# Patient Record
Sex: FEMALE | Race: WHITE | Employment: FULL TIME | URBAN - METROPOLITAN AREA
[De-identification: names, ages, dates, MRNs, and addresses within clinical notes are randomized per-mention and may not be internally consistent; named-entity substitution may affect disease eponyms.]

---

## 2017-10-12 ENCOUNTER — TRANSFERRED RECORDS (OUTPATIENT)
Dept: HEALTH INFORMATION MANAGEMENT | Facility: CLINIC | Age: 42
End: 2017-10-12

## 2017-10-20 DIAGNOSIS — Z79.899 POLYPHARMACY: ICD-10-CM

## 2017-10-20 DIAGNOSIS — O09.529 AMA (ADVANCED MATERNAL AGE) MULTIGRAVIDA 35+: Primary | ICD-10-CM

## 2017-10-20 DIAGNOSIS — Z31.69 ENCOUNTER FOR PRECONCEPTION CONSULTATION: ICD-10-CM

## 2017-10-27 ENCOUNTER — PRE VISIT (OUTPATIENT)
Dept: MATERNAL FETAL MEDICINE | Facility: CLINIC | Age: 42
End: 2017-10-27

## 2017-10-31 ENCOUNTER — OFFICE VISIT (OUTPATIENT)
Dept: MATERNAL FETAL MEDICINE | Facility: CLINIC | Age: 42
End: 2017-10-31
Attending: OBSTETRICS & GYNECOLOGY
Payer: COMMERCIAL

## 2017-10-31 ENCOUNTER — OFFICE VISIT (OUTPATIENT)
Dept: PHARMACY | Facility: CLINIC | Age: 42
End: 2017-10-31
Payer: COMMERCIAL

## 2017-10-31 VITALS — DIASTOLIC BLOOD PRESSURE: 85 MMHG | HEART RATE: 90 BPM | SYSTOLIC BLOOD PRESSURE: 128 MMHG

## 2017-10-31 DIAGNOSIS — Z31.69 INFERTILITY COUNSELING: ICD-10-CM

## 2017-10-31 DIAGNOSIS — E28.2 PCOS (POLYCYSTIC OVARIAN SYNDROME): ICD-10-CM

## 2017-10-31 DIAGNOSIS — Z82.49 FAMILY HISTORY OF BLOOD CLOTS: ICD-10-CM

## 2017-10-31 DIAGNOSIS — O09.529 AMA (ADVANCED MATERNAL AGE) MULTIGRAVIDA 35+: ICD-10-CM

## 2017-10-31 DIAGNOSIS — Z79.899 POLYPHARMACY: ICD-10-CM

## 2017-10-31 DIAGNOSIS — I10 CHRONIC HYPERTENSION: Primary | ICD-10-CM

## 2017-10-31 DIAGNOSIS — Z31.69 ENCOUNTER FOR PRECONCEPTION CONSULTATION: ICD-10-CM

## 2017-10-31 DIAGNOSIS — I10 BENIGN ESSENTIAL HYPERTENSION: Primary | ICD-10-CM

## 2017-10-31 DIAGNOSIS — F43.22 ADJUSTMENT DISORDER WITH ANXIOUS MOOD: ICD-10-CM

## 2017-10-31 DIAGNOSIS — K21.9 GASTROESOPHAGEAL REFLUX DISEASE WITHOUT ESOPHAGITIS: ICD-10-CM

## 2017-10-31 DIAGNOSIS — F32.A DEPRESSION, UNSPECIFIED DEPRESSION TYPE: ICD-10-CM

## 2017-10-31 DIAGNOSIS — Z31.69 ENCOUNTER FOR PRECONCEPTION CONSULTATION: Primary | ICD-10-CM

## 2017-10-31 DIAGNOSIS — J30.2 SEASONAL ALLERGIC RHINITIS, UNSPECIFIED CHRONICITY, UNSPECIFIED TRIGGER: ICD-10-CM

## 2017-10-31 PROCEDURE — 96040 ZZH GENETIC COUNSELING, EACH 30 MINUTES: CPT | Mod: ZF | Performed by: GENETIC COUNSELOR, MS

## 2017-10-31 PROCEDURE — 99607 MTMS BY PHARM ADDL 15 MIN: CPT | Performed by: PHARMACIST

## 2017-10-31 PROCEDURE — 99605 MTMS BY PHARM NP 15 MIN: CPT | Performed by: PHARMACIST

## 2017-10-31 RX ORDER — ALBUTEROL SULFATE 90 UG/1
1-2 AEROSOL, METERED RESPIRATORY (INHALATION) EVERY 6 HOURS PRN
COMMUNITY

## 2017-10-31 RX ORDER — CLONAZEPAM 1 MG/1
TABLET ORAL
COMMUNITY

## 2017-10-31 RX ORDER — VENLAFAXINE HYDROCHLORIDE 150 MG/1
150 CAPSULE, EXTENDED RELEASE ORAL DAILY
COMMUNITY

## 2017-10-31 RX ORDER — FLUTICASONE PROPIONATE 50 MCG
2 SPRAY, SUSPENSION (ML) NASAL 2 TIMES DAILY
COMMUNITY

## 2017-10-31 NOTE — PATIENT INSTRUCTIONS
Recommendations from today's MTM visit:                                                    MTM (medication therapy management) is a service provided by a clinical pharmacist designed to help you get the most of out of your medicines.     1.  Lisinopril is associated with an increased risk of birth defects when used early in pregnancy.  I would recommend that you stop this prior to conception.  If your blood pressure goes up, safe medications in pregnancy include labetolol or methyldopa.  Alternatively, the HCTZ dose could be increased back to 25 mg in BP increases.    2. For your allergies, it is safe to continue the Flonase, Zyrtec and albuterol inhaler when needed for shortness of breath.    3. For your PCOS, it is safe to continue the metformin, but you can discuss the ongoing need for this with your OB team moving forward.    4. For the omeprazole, it is likely safe to continue to use this in pregnancy, if you need it to control your heartburn symptoms.    5.  For the anxiety, it is best to be off of benzodiazepines (which is your clonazepam) during pregnancy.   You are currently taking clonazepam 0.5 mg every morning and 1 mg at night.  I'd recommend you follow-up with your NP, Alysa Benz, to discuss a plan for this.  One option would be to taper as follows:  Decrease to 0.5 mg twice a day (1/2 tablet twice a day) for 1-2 weeks, then decrease to 0.5 mg at bedtime for 1-2 weeks, then off.  With this change, you can stay stable on the venlafaxine for now;  You can talk with Alysa about changing back to sertraline in the future if you both feel this would be an effective option for you.  You can also talk with Alysa about the option to have a prescription for lorazepam for treatment of panic attacks if needed (rare use) once the clonazepam is stopped.          Next MTM visit: as needed    To schedule another MTM appointment, please call the clinic directly or you may call the MTM scheduling line at  [phone] or toll-free at [phone].     My Clinical Pharmacist's contact information:                                                      It was a pleasure seeing you today!  Please feel free to contact me with any questions or concerns you have.      Trudy Teixeira, Pharm.D., [facility]  Phone:  [phone]      You may receive a survey about the MTM services you received.  I would appreciate your feedback to help me serve you better in the future. Please fill it out and return it when you can. Your comments will be anonymous.

## 2017-10-31 NOTE — NURSING NOTE
Yeimi seen today for preconception GC and MFM consult for AMA, polypharmacy, family Hx of Factor 5. Allergies/medications verified with pt- Yeimi met with Pharmacist at [facility] prior to her MFM consult today. MFM recommendations will be sent to referring provider. Patient discharged stable and ambulatory. STEW KAYE RN

## 2017-10-31 NOTE — MR AVS SNAPSHOT
"              After Visit Summary   10/31/2017    Yeimi Cage    MRN: [id number]           Patient Information     Date Of Birth          1975        Visit Information        Provider Department      10/31/2017 11:00 AM Trudy Lipscomb MD [facility] Maternal Fetal Medicine [facility]        Today's Diagnoses     Chronic hypertension    -  1    Encounter for preconception consultation        PCOS (polycystic ovarian syndrome)        Class 2 obesity due to excess calories with serious comorbidity and body mass index (BMI) of 39.0 to 39.9 in adult        Depression, unspecified depression type        Polypharmacy        Family history of blood clots        Infertility counseling           Follow-ups after your visit        Who to contact     If you have questions or need follow up information about today's clinic visit or your schedule please contact [facility] MATERNAL FETAL MEDIC[facility] directly at [phone].  Normal or non-critical lab and imaging results will be communicated to you by WatchPartyhart, letter or phone within 4 business days after the clinic has received the results. If you do not hear from us within 7 days, please contact the clinic through WatchPartyhart or phone. If you have a critical or abnormal lab result, we will notify you by phone as soon as possible.  Submit refill requests through [employer] or call your pharmacy and they will forward the refill request to us. Please allow 3 business days for your refill to be completed.          Additional Information About Your Visit        WatchPartyhart Information     [employer] lets you send messages to your doctor, view your test results, renew your prescriptions, schedule appointments and more. To sign up, go to [email or web address] . Click on \"Log in\" on the left side of the screen, which will take you to the Welcome page. Then click on \"Sign up Now\" on the right side of the page.     You will be asked to enter the access code listed below, as well " as some personal information. Please follow the directions to create your username and password.     Your access code is: [email or web address]  Expires: 2018  6:30 AM     Your access code will  in 90 days. If you need help or a new code, please call your [place] clinic or [phone].        Care EveryWhere ID     This is your Care EveryWhere ID. This could be used by other organizations to access your [place] medical records  [id number]         Blood Pressure from Last 3 Encounters:   10/31/17 128/85    Weight from Last 3 Encounters:   No data found for Wt              We Performed the Following     MFM Office Visit        Primary Care Provider    None Specified       No primary provider on file.        Equal Access to Services     KAREEM FELIX : Norma Walker, jodie bentley, rajwinder west, edward cotter . So [facility] [phone].    ATENCIÓN: Si habla español, tiene a dunaway disposición servicios gratuitos de asistencia lingüística. Llame al [phone].    We comply with applicable federal civil rights laws and [place] laws. We do not discriminate on the basis of race, color, national origin, age, disability, sex, sexual orientation, or gender identity.            Thank you!     Thank you for choosing [facility]  for your care. Our goal is always to provide you with excellent care. Hearing back from our patients is one way we can continue to improve our services. Please take a few minutes to complete the written survey that you may receive in the mail after your visit with us. Thank you!             Your Updated Medication List - Protect others around you: Learn how to safely use, store and throw away your medicines at www.disposemymeds.org.          This list is accurate as of: 10/31/17 11:59 PM.  Always use your most recent med list.                   Brand Name Dispense Instructions for use Diagnosis    albuterol 108  (90 BASE) MCG/ACT Inhaler    PROAIR HFA/PROVENTIL HFA/VENTOLIN HFA     Inhale 1-2 puffs into the lungs every 6 hours as needed for shortness of breath / dyspnea        clonazePAM 1 MG tablet    klonoPIN     Take 1/2 tablet (0.5 mg) by mouth in the morning and 1 tablet (1 mg) in the evening.        fluticasone 50 MCG/ACT spray    FLONASE     Spray 2 sprays into both nostrils 2 times daily        HYDROCHLOROTHIAZIDE PO      Take 12.5 mg by mouth daily        LISINOPRIL PO      Take 10 mg by mouth daily        METFORMIN HCL PO      Take 1,000 mg by mouth 2 times daily (with meals)        OMEPRAZOLE PO      Take 40 mg by mouth daily        venlafaxine 150 MG 24 hr capsule    EFFEXOR-XR     Take 150 mg by mouth daily

## 2017-10-31 NOTE — MR AVS SNAPSHOT
After Visit Summary   10/31/2017    Yeimi Cage    MRN: [id number]           Patient Information     Date Of Birth          1975        Visit Information        Provider Department      10/31/2017 9:00 AM Trudy Teixeira, [facility] MTM        Care Instructions    Recommendations from today's MTM visit:                                                    MTM (medication therapy management) is a service provided by a clinical pharmacist designed to help you get the most of out of your medicines.     1.  Lisinopril is associated with an increased risk of birth defects when used early in pregnancy.  I would recommend that you stop this prior to conception.  If your blood pressure goes up, safe medications in pregnancy include labetolol or methyldopa.  Alternatively, the HCTZ dose could be increased back to 25 mg in BP increases.    2. For your allergies, it is safe to continue the Flonase, Zyrtec and albuterol inhaler when needed for shortness of breath.    3. For your PCOS, it is safe to continue the metformin, but you can discuss the ongoing need for this with your OB team moving forward.    4. For the omeprazole, it is likely safe to continue to use this in pregnancy, if you need it to control your heartburn symptoms.    5.  For the anxiety, it is best to be off of benzodiazepines (which is your clonazepam) during pregnancy.   You are currently taking clonazepam 0.5 mg every morning and 1 mg at night.  I'd recommend you follow-up with your NP, Alysa Benz, to discuss a plan for this.  One option would be to taper as follows:  Decrease to 0.5 mg twice a day (1/2 tablet twice a day) for 1-2 weeks, then decrease to 0.5 mg at bedtime for 1-2 weeks, then off.  With this change, you can stay stable on the venlafaxine for now;  You can talk with Alysa about changing back to sertraline in the future if you both feel this would be an effective option for you.  You can  also talk with Alysa about the option to have a prescription for lorazepam for treatment of panic attacks if needed (rare use) once the clonazepam is stopped.          Next MTM visit: as needed    To schedule another MTM appointment, please call the clinic directly or you may call the MTM scheduling line at [phone] or toll-free at [phone].     My Clinical Pharmacist's contact information:                                                      It was a pleasure seeing you today!  Please feel free to contact me with any questions or concerns you have.      Trudy Teixeira, Pharm.D., [facility]  Phone:  [phone]      You may receive a survey about the MTM services you received.  I would appreciate your feedback to help me serve you better in the future. Please fill it out and return it when you can. Your comments will be anonymous.                      Follow-ups after your visit        Your next 10 appointments already scheduled     Oct 31, 2017 10:15 AM CDT   Genetic Counseling with UR GEN COUNSELOR 2   [facility] Maternal Fetal Medicine [facility])    [address]  [facility] [address]   [phone]            Oct 31, 2017 11:00 AM CDT   Consult MFM with UR EXAM RM 1   [facility] Maternal Fetal Medicine - [facility])    [address]  [facility] [address]   [phone]              Who to contact     If you have questions or need follow up information about today's clinic visit or your schedule please contact [facility] directly at [phone].  Normal or non-critical lab and imaging results will be communicated to you by MyChart, letter or phone within 4 business days after the clinic has received the results. If you do not hear from us within 7 days, please contact the clinic through MyChart or phone. If you have a critical or abnormal lab result, we will notify you by phone as  "soon as possible.  Submit refill requests through [employer] or call your pharmacy and they will forward the refill request to us. Please allow 3 business days for your refill to be completed.          Additional Information About Your Visit        semiosBIO Technologieshart Information     [employer] mobile marketing lets you send messages to your doctor, view your test results, renew your prescriptions, schedule appointments and more. To sign up, go to [email or web address] . Click on \"Log in\" on the left side of the screen, which will take you to the Welcome page. Then click on \"Sign up Now\" on the right side of the page.     You will be asked to enter the access code listed below, as well as some personal information. Please follow the directions to create your username and password.     Your access code is: [email or web address]  Expires: 2018  6:30 AM     Your access code will  in 90 days. If you need help or a new code, please call your [place] clinic or [phone].        Care EveryWhere ID     This is your Care EveryWhere ID. This could be used by other organizations to access your [place] medical records  [id number]        Your Vitals Were     Pulse                   90            Blood Pressure from Last 3 Encounters:   10/31/17 128/85    Weight from Last 3 Encounters:   No data found for Wt              Today, you had the following     No orders found for display       Primary Care Provider    None Specified       No primary provider on file.        Equal Access to Services     KAREEM [facility] : Hadii michael robertson Soramiro, waaxda luqadaha, qaybta kaalmada edward west . So [facility] [phone].    ATENCIÓN: Si habla español, tiene a dunaway disposición servicios gratuitos de asistencia lingüística. Nanda al [phone].    We comply with applicable federal civil rights laws and [place] laws. We do not discriminate on the basis of race, color, national origin, age, disability, sex, sexual orientation, " or gender identity.            Thank you!     Thank you for choosing [facility]  for your care. Our goal is always to provide you with excellent care. Hearing back from our patients is one way we can continue to improve our services. Please take a few minutes to complete the written survey that you may receive in the mail after your visit with us. Thank you!             Your Updated Medication List - Protect others around you: Learn how to safely use, store and throw away your medicines at www.disposemymeds.org.          This list is accurate as of: 10/31/17  9:56 AM.  Always use your most recent med list.                   Brand Name Dispense Instructions for use Diagnosis    albuterol 108 (90 BASE) MCG/ACT Inhaler    PROAIR HFA/PROVENTIL HFA/VENTOLIN HFA     Inhale 1-2 puffs into the lungs every 6 hours as needed for shortness of breath / dyspnea        clonazePAM 1 MG tablet    klonoPIN     Take 1/2 tablet (0.5 mg) by mouth in the morning and 1 tablet (1 mg) in the evening.        fluticasone 50 MCG/ACT spray    FLONASE     Spray 2 sprays into both nostrils 2 times daily        HYDROCHLOROTHIAZIDE PO      Take 12.5 mg by mouth daily        LISINOPRIL PO      Take 10 mg by mouth daily        METFORMIN HCL PO      Take 1,000 mg by mouth 2 times daily (with meals)        OMEPRAZOLE PO      Take 40 mg by mouth daily        VENLAFAXINE HCL PO      Take 150 mg by mouth daily

## 2017-10-31 NOTE — PROGRESS NOTES
[facility]  Genetic Counseling Consult    Patient: Yeimi Cage YOB: 1975   Date of Service: 2017 Referring Provider:  Dr. Guerda Saldaña     Yeimi Cage was seen at [facility] for a preconception genetic consultation to discuss fetal risks associated with advanced maternal age and the options for screening and testing for fetal chromosome abnormalities that would be offered to her in a future pregnancy.  She was accompanied to clinic today by her mother.       Summary/Plan:     1.  In general:  At age 42 at delivery, the risk to have a baby at birth with a chromosome problem is about 1.5%; at age 43 at delivery, the risk to have a baby at birth with a chromosome problem is about 2%; At age 44 at delivery, the risk to have a baby at birth with a chromosome problem is about 2.5%.      2.  Yeimi reports that she is planning on trying to conceive a pregnancy via IVF with her eggs and her partner's sperm. She states that her reproductive medicine specialist mentioned that they will likely be doing preimplantation screening of the embryos for abnormal chromosomes; we talked about that this preimplantation screening would be expected to significantly reduce (but not completely eliminate) the risk of a chromosome problem in a pregnancy.  Therefore, in a future pregnancy, she would still be offered screening/testing for fetal chromosome problems.    3.  We discussed the various screening and diagnostic tests available to women who are of advanced maternal age during pregnancy.  (See details below.)    4.  Today, Yeimi also had a consultation with pharmacy (regarding her medications and pregnancy) and with maternal-fetal medicine.  See Dr. Teixeira's and Dr. Lipscomb' reports, respectively, for details of those conversations.    Pregnancy History:   /Parity:    Age: 42 year old    Yeimi reports  that she has never been pregnant.  It is noted in her records that she had 4 rounds of Clomid + IUI (with a previous partner) that were not successful in achieving a pregnancy.  Yeiim reports that she had recently talked with her reproductive medicine specialist and was told that her previous testing indicated they could proceed with their planned IVF cycle after completing today's appointments.      Yeimi's records report that she has previously had an endometrial polyp removed.  She states today that her reproductive medicine specialist recently told her that she has more uterine polpys and that the plan is to remove these at the time of her planned egg retrieval.        Yeimi reports that her partner has previously fathered 3 children.  She reports that he had previously had a vasectomy that he has now had reversed.    Medical History:       Yeimi reports that she first starting taking blood pressure medication about 2 or 3 years ago.  She reports that she is currently taking Lisinopril and hydrochlorothiazide.        Yeimi reports a history of anxiety/depression, for which she currently takes Effexor and clonazepam.  She reports that she feels like her mental health symptoms are well-controlled on this regimen.      Yeimi reports that she takes metformin for her diagnosis of polycystic ovarian syndrome.      Yeimi reports that she take omeprazole for gastric reflux.      Yeimi reports that she has been having some issues with respiratory symptoms that she states might be consistent with allergies and/or asthma.  She reports that she has been taking Flonase, Zyrtec, and Mucinex for these symptoms.      Yeimi's reproductive medicine specialist had wanted some consultation about the use of these medications during a future pregnancy.  We talked about that it is often difficult to give exact answers about the potential effects of various exposures during pregnancy, because it is not  possible to conduct randomized, controlled trials to obtain information; instead, we often rely on potentially more biased sources of information (such as retrospective studies or case reports).  We talked about that it is important with any medication to balance the benefits of the medication with concerns about potential risks to a developing fetus when women and their health care providers are considering a woman's medication regimen during pregnancy.    Prior to her genetic counseling consultation, Yeimi had a consultation with pharmacy about these medications and their use during pregnancy.  Please see Dr. Teixeira's report for more details.       Family History:     A four-generation pedigree was obtained and will be scanned under the  Media  tab in EPIC. The following significant findings were reported by and her mother:      Yeimi's mother reports that she has two siblings that had some learning disabilities that required special assistance in school.  She reports that they live independently and have their own families.  We talked about that some families have an increased incidence of learning issues and are thought to likely have a genetic risk factor(s) in the family accounting for this.  However, what these exact genetic risk factors are is not well know for most families.  This family history could be important for family members to be aware of, so that appropriate screening and monitoring can be done for potential learning issues.      Yeimi reports that her father has had multiple DVTs; it is reports that these blood clots were precipitated by injury/surgery.  They do report that he also has A-fib and now takes blood thinners related to that history.  We talked about that sometimes there can be genetic risk factors related to the risk of forming abnormal blood clots, but they are not aware of a potential genetic risk factor being identified in Yeimi's father.      Yeimi also reports  "that her father has a history of seizures.   Yeimi's mother reports that it is thought that these adult-onset seizures are related to \"scar tissue\" from a seizure that Yeimi's father had as a child during an illness.  They were not aware of any specific genetic condition being diagnosed in relationship to these seizures.  We talked about that seizures can sometimes be associated with a particular genetic condition, but they can also occur as an isolated condition.  Even in isolated seizures/epilepsy, it is thought that there could be genetic risk factors influencing the chance of seizure disorder in some families.  They report no other known family history of seizures.      In addition to her own personal history of anxiety/depression, Yeimi also reports a family history of several other relatives with anxiety in her family and her partner's family history.  We talked about that individuals with a family history of mental health conditions are generally thought to be at increased risk to develop these conditions themselves; the exact genetic and/or environmental factors that account for this are not well understood.  We talked about that this type of family history is important to be aware of so individuals can be monitored for potential symptoms of mental health conditions.    Otherwise, the reported family history is negative for multiple miscarriages, stillbirths, birth defects, severe intellectual disability, known genetic conditions, and consanguinity.       Carrier Screening:       Yeimi reports that she and her partner are both of  ancestry.  We talked about that cystic fibrosis is an autosomal recessive genetic condition that occurs with increased frequency in individuals of  ancestry and carrier screening for this condition is available.  In addition, we talked about  screening in the [facility] includes cystic fibrosis.    In addition to ethnic-based carrier " screening, we talked about that expanded carrier screening for mutations in a large panel of genes associated with autosomal recessive conditions (including cystic fibrosis, spinal muscular atrophy, and others) and X-linked recessive conditions (like Fragile X syndrome) is now available.      I provided Yeimi with some written information about carrier screening.  She was going to let either myself or her reproductive medical specialist know if she decides that she is interested in carrier screening.       Risk Assessment for Chromosome Conditions:       We talked about that the risk for fetal chromosome abnormalities increases with maternal age. We briefly discussed specific features of common chromosome abnormalities, including Down syndrome, trisomy 13, trisomy 18, and sex chromosome trisomies.      We reviewed that:  At age 42 at delivery, the risk to have a baby at birth with a chromosome problem is about 1.5%; at age 43 at delivery, the risk to have a baby at birth with a chromosome problem is about 2%; At age 44 at delivery, the risk to have a baby at birth with a chromosome problem is about 2.5%.        Yeimi reports that she is planning on trying to conceive a pregnancy via IVF with her eggs and her partner's sperm. She states that her reproductive medicine specialist mentioned that they will likely be doing preimplantation screening of the embryos for abnormal chromosomes; we talked about that this preimplantation screening would be expected to significantly reduce (but not completely eliminate) the risk of a chromosome problem in a pregnancy.  Therefore, in a future pregnancy, she would still be offered screening/testing for fetal chromosome problems.       Testing Options:       We reviewed the benefits and limitations of screening and diagnostic tests:    - We talked about that screening tests provide a risk assessment specific to the pregnancy for certain fetal chromosome abnormalities, but  cannot definitively diagnose or exclude a fetal chromosome abnormality. Follow-up genetic counseling and consideration of diagnostic testing is recommended with any abnormal screening result.     - We discussed that dagnostic tests are associated with a risk of miscarriage. These tests can detect fetal chromosome abnormalities with greater than 99% certainty. Results can rarely be complicated by maternal cell contamination or mosaicism and are limited by the resolution of cytogenetic G-banding technology.     -There is no screening nor diagnostic test that can detect all forms of birth defects or mental disability.      We discussed the following screening and testing options for a future pregnancy:     Non-invasive Prenatal Testing (NIPT)  - This test involves measurement of cell-free DNA testing, which is of both maternal and placental/fetal origin.  - First trimester ultrasound with nuchal translucency and nasal bone assessment is recommended to be performed along with NIPT, when appropriate.  - This test screens for fetal trisomy 21, trisomy 13, trisomy 18, and sex chromosome aneuploidy.  - While this test is thought to be highly specific/sensitive with respect to screening for trisomy 21, trisomy 13, and trisomy 18, rare false positives and false negatives do occur. There is still limited data about the exact sensitivity/specificity of this test with respect to screening for sex chromosome aneuploidy.  - Insurance coverage for this test is variable, and so patients are encouraged to contact their insurance companies if there are questions about coverage for this test.  -  This test cannot screen for open neural tube defects, and so consideration of maternal serum AFP 15 weeks or later is recommended.     Chorionic villus sampling (CVS)  - This invasive procedure is typically performed between 10 and 13 weeks of pregnancy, through which placental villi are obtained for the purpose of chromosome analysis and/or  other prenatal genetic analysis.  - CVS is considered a diagnostic test for chromosome problems during pregnancy. Maternal cell contamination studies are performed, when indicated.  -The risk of pregnancy loss associated with a CVS procedure is generally estimated to be 1/200 or less.  -This test cannot screen for open neural tube defects, and so consideration of maternal serum AFP 15 weeks or later is recommended.     Genetic Amniocentesis  - This is an invasive procedure typically performed at 15 weeks or later, through which amniotic fluid is obtained for the purpose of chromosome analysis and/or other prenatal genetic analysis.  - Amniocentesis is considered a diagnostic test for chromosome problems during pregnancy.  - The risk of pregnancy loss associated with amniocentesis is generally estimated to be 1/500 or less.  - Amniotic fluid AFP measurement is also done to screen for the possibility of open neural tube or ventral defects.     Comprehensive (Level II) ultrasound  - This detailed ultrasound is usually performed between 18-22 weeks gestation to screen for major birth defects.  - It also screens for ultrasound markers that might increase the risk for a chromosome problem in a pregnancy.     Face-to-face time of the genetic counseling appointment was 40 minutes.    Sonal Jain MS, [facility]  Genetic Counselor  Ph: [phone]  Pager: [phone]

## 2017-10-31 NOTE — PROGRESS NOTES
SUBJECTIVE/OBJECTIVE:                           Yeimi Cage is a 42 year old female coming in for an initial visit for Medication Therapy Management.  She was referred to me from Maternal Fetal Medicine.     Chief Complaint: medications safe for preconception planning.    Medication Adherence/Access: no concerns reported    Allergies/shortness of breath: Yeimi has been experiencing coughing fits that go on for a few minutes for the past few months, and is not sure if she has asthma. She has been using Flonase nasal spray and Zyrtec to manage some allergy-like symptoms, which are effective. She was prescribed an albuterol inhaler and used it only once because she didn't like how it made her feel (jittery, tachycardic).    Anxiety: Yeimi has been taking venlafaxine  mg for several years, and feels it is effective for managing her anxiety. In the past, when trying to conceive, she was taking sertraline, but couldn't remember if it was effective or not. Yeimi also takes clonazepam regularly for anxiety (0.5 mg QAM and 1 mg QHS). She sees a psychiatric NP every 6 months to manage this condition.    Blood pressure: Yeimi reports her blood pressures have been around 117-120/80 at past visits. She is taking HCTZ 12.5 mg and lisinopril 10 mg daily for blood pressure.     PCOS: Yeimi is currently taking metformin 1000 mg BID for PCOS, and has been taking it for the past 10-12 years. She is not sure of its effect on her PCOS, but notes that she started having irregular periods after starting metformin (vs. no periods prior)    Heartburn: Yeimi has been taking omeprazole 40 mg daily for heartburn and feels it is effective. She occasionally treats acid reflux with Maalox.    Current Outpatient Prescriptions   Medication Sig Dispense Refill     clonazePAM (KLONOPIN) 1 MG tablet Take 1/2 tablet (0.5 mg) by mouth in the morning and 1 tablet (1 mg) in the evening.       fluticasone (FLONASE) 50 MCG/ACT  spray Spray 2 sprays into both nostrils 2 times daily       albuterol (PROAIR HFA/PROVENTIL HFA/VENTOLIN HFA) 108 (90 BASE) MCG/ACT Inhaler Inhale 1-2 puffs into the lungs every 6 hours as needed for shortness of breath / dyspnea       venlafaxine (EFFEXOR-XR) 150 MG 24 hr capsule Take 150 mg by mouth daily       METFORMIN HCL PO Take 1,000 mg by mouth 2 times daily (with meals)       LISINOPRIL PO Take 10 mg by mouth daily        HYDROCHLOROTHIAZIDE PO Take 12.5 mg by mouth daily       OMEPRAZOLE PO Take 40 mg by mouth daily         Current labs include:  BP Readings from Last 3 Encounters:   10/31/17 128/85     Today's Vitals: /85  Pulse 90      There is no immunization history on file for this patient.    ASSESSMENT:                             Medication Adherence: no issues identified    Allergies/shortness of breath: Flonase and Zyrtec are effective for Yeimi's allergies, and can be safely used in pregnancy as well. For occasional shortness of breath and coughing, PRN use of albuterol inhaler is reasonable to use and safe in pregnancy. Using 1 puff instead of 2 puffs may reduce jittery feeling and tachycardia. Patient can consider future pulmonary function testing to fully determine if she has asthma, but it may not be a priority for her at this time, given that the treatment would not need to change with pregnancy and she is currently well managed.    Anxiety: Venlafaxine ER is effective for managing Yeimi's anxiety; however it is not well studied in pregnancy. Using venlafaxine in pregnancy may lead to withdrawal in the . Patient can consider switching antidepressants to a more well-studied SSRI if desired, but her mental health during pregnancy is also a priority, and she could consider continuing venlafaxine as well.   Clonazepam and other benzodiazepines, on the other hand, are not recommended for use in pregnancy. Ongoing use may lead to premature birth or withdrawal symptoms in the  . Yeimi should consider tapering clonazepam and discontinuing the medication when pregnant. She could still use a short-acting benzodiazepine, such as lorazepam, for acute anxiety attacks while pregnant if needed. Final decisions about Yeimi's anxiety medications should be made with her psychiatric NP (Alysa Benz).     Blood pressure: Yeimi's blood pressure is generally well-controlled on medications and at goal of less than 140/90 mmHg. Lisinopril is contraindicated in pregnancy and should be discontinued throughout preconception and prenatal care. It is possible that her current dose of HCTZ (12.5 mg) will be enough to manage her blood pressure during pregnancy; if not, she can consider increasing dose to HCTZ 25 mg daily, or changing medications to labetalol or methyldopa. Blood pressure should continue to be monitored at home when lisinopril is discontinued.    PCOS: It is possible that this patient no longer needs metformin with respect to its use in PCOS and ovulation. However she may be receiving blood sugar benefits from its use (fasting blood sugars consistently <120 per chart review). Metformin is safe to use in pregnancy, but Yeimi should discuss with her OB providers if it is still needed for treating PCOS.    Heartburn: Omeprazole is safe to use in pregnancy. Yeimi may experience worsened nausea/vomiting in pregnancy given her history of heartburn, and continued use of omeprazole may help to manage those symptoms.    PLAN:                              1.  Lisinopril is associated with an increased risk of birth defects when used early in pregnancy.  I would recommend that you stop this prior to conception.  If your blood pressure goes up, safe medications in pregnancy include labetalol or methyldopa.  Alternatively, the HCTZ dose could be increased back to 25 mg in BP increases.    2. For your allergies, it is safe to continue the Flonase, Zyrtec and albuterol inhaler when  needed for shortness of breath.    3. For your PCOS, it is safe to continue the metformin, but you can discuss the ongoing need for this with your OB team moving forward.    4. For the omeprazole, it is likely safe to continue to use this in pregnancy, if you need it to control your heartburn symptoms.    5.  For the anxiety, it is best to be off of benzodiazepines (which is your clonazepam) during pregnancy.   You are currently taking clonazepam 0.5 mg every morning and 1 mg at night.  I'd recommend you follow-up with your NP, Alysa Benz, to discuss a plan for this.  One option would be to taper as follows:  Decrease to 0.5 mg twice a day (1/2 tablet twice a day) for 1-2 weeks, then decrease to 0.5 mg at bedtime for 1-2 weeks, then off.  With this change, you can stay stable on the venlafaxine for now;  You can talk with Alysa about changing back to sertraline in the future if you both feel this would be an effective option for you.  You can also talk with Alysa about the option to have a prescription for lorazepam for treatment of panic attacks if needed (rare use) once the clonazepam is stopped.    I spent 50 minutes with this patient today. I offer these suggestions for consideration by the PCP and the care team. A copy of the visit note was provided to the patient's primary care provider.    Will follow up as needed and available for ongoing consultation with patient and team as needed.    The patient was given a summary of these recommendations as an after visit summary.     Whit Patterson, PharmD Student    I was present with the student during the assessment and I approve of the above plan and documentation.        Trudy Teixeira, Quentin.D., BCPS

## 2017-10-31 NOTE — MR AVS SNAPSHOT
"              After Visit Summary   10/31/2017    Yeimi Cage    MRN: [id number]           Patient Information     Date Of Birth          1975        Visit Information        Provider Department      10/31/2017 10:15 AM Sherita Jain GC [facility] Maternal Fetal [facility]        Today's Diagnoses     Encounter for preconception consultation    -  1    AMA (advanced maternal age) multigravida 35+        Polypharmacy           Follow-ups after your visit        Who to contact     If you have questions or need follow up information about today's clinic visit or your schedule please contact [facility]ER[facility] FETAL [facility] directly at [phone].  Normal or non-critical lab and imaging results will be communicated to you by [employer], letter or phone within 4 business days after the clinic has received the results. If you do not hear from us within 7 days, please contact the clinic through [employer] or phone. If you have a critical or abnormal lab result, we will notify you by phone as soon as possible.  Submit refill requests through [employer] or call your pharmacy and they will forward the refill request to us. Please allow 3 business days for your refill to be completed.          Additional Information About Your Visit        MyChart Information     [employer] lets you send messages to your doctor, view your test results, renew your prescriptions, schedule appointments and more. To sign up, go to [email or web address] . Click on \"Log in\" on the left side of the screen, which will take you to the Welcome page. Then click on \"Sign up Now\" on the right side of the page.     You will be asked to enter the access code listed below, as well as some personal information. Please follow the directions to create your username and password.     Your access code is: [email or web address]  Expires: 2018  6:30 AM     Your access code will  in 90 days. If you need help or a new code, please call your " [facility] or [phone].        Care EveryWhere ID     This is your Care EveryWhere ID. This could be used by other organizations to access your [place] medical records  [id number]         Blood Pressure from Last 3 Encounters:   10/31/17 128/85    Weight from Last 3 Encounters:   No data found for Wt              We Performed the Following     M Genetic Counseling        Primary Care Provider    None Specified       No primary provider on file.        Equal Access to Services     DANIEL FELIX : Hadii aad ku hadasho Soomaali, waaxda luqadaha, qaybta kaalmada adeegyada, waxay jessein sabrinan aderadha michael larubiorobyn . So [facility] [phone].    ATENCIÓN: Si maria isabella trudy, tiene a dunaway disposición servicios gratuitos de asistencia lingüística. Llame al [phone].    We comply with applicable federal civil rights laws and [place] laws. We do not discriminate on the basis of race, color, national origin, age, disability, sex, sexual orientation, or gender identity.            Thank you!     Thank you for choosing [facility]  for your care. Our goal is always to provide you with excellent care. Hearing back from our patients is one way we can continue to improve our services. Please take a few minutes to complete the written survey that you may receive in the mail after your visit with us. Thank you!             Your Updated Medication List - Protect others around you: Learn how to safely use, store and throw away your medicines at www.disposemymeds.org.          This list is accurate as of: 10/31/17 11:59 PM.  Always use your most recent med list.                   Brand Name Dispense Instructions for use Diagnosis    albuterol 108 (90 BASE) MCG/ACT Inhaler    PROAIR HFA/PROVENTIL HFA/VENTOLIN HFA     Inhale 1-2 puffs into the lungs every 6 hours as needed for shortness of breath / dyspnea        clonazePAM 1 MG tablet    klonoPIN     Take 1/2 tablet (0.5 mg) by mouth in the  morning and 1 tablet (1 mg) in the evening.        fluticasone 50 MCG/ACT spray    FLONASE     Spray 2 sprays into both nostrils 2 times daily        HYDROCHLOROTHIAZIDE PO      Take 12.5 mg by mouth daily        LISINOPRIL PO      Take 10 mg by mouth daily        METFORMIN HCL PO      Take 1,000 mg by mouth 2 times daily (with meals)        OMEPRAZOLE PO      Take 40 mg by mouth daily        venlafaxine 150 MG 24 hr capsule    EFFEXOR-XR     Take 150 mg by mouth daily

## 2017-11-02 NOTE — PROGRESS NOTES
Maternal Fetal Medicine Consult      Referring Physician: Dr. Guerda Saldaña  Reason for Referral: Preconception counseling       Dear Dr. Saldaña,     Thank you for your request for maternal fetal medicine consultation on your patient, Yeimi Cage. She is here today with her mother.       HPI: Yeimi Cage is a 42 year old G0 with a known medical history of chronic hypertension, obesity, PCOS, and anxiety/depression who presents for preconception counseling prior to planned IVF. She reports failed clomid and IUI with a previous partner. Her partner is 43 years old and has had a vasectomy, which has been reversed. He has three children who are healthy.     She is currently on metformin for her PCOS.  For hypertension, she is currently taking lisinopril and hydrochlorothiazide. She denies complications from hypertension. She reports a long history of anxiety/depression (diagnosed as a teenager). She is currently taking Venlafaxine and Clonazepam, which is controlling her anxiety well. She has tried Sertraline in the past and it has not been effective for her.    She reports her father has a history of DVTs. His first DVT was following a back injury. He was on bedrest for 2 weeks and developed a DVT. His second DVT was following a hip replacement. He is currently on warfarin for atrial fibrillation. She is not sure if he underwent hypercoagulability workup.     Her mother reports her menstrual cycles were typically 60 days long. She has had two successful pregnancies and two miscarriages.     They otherwise deny a family history of clotting and miscarriages.     History:  ObHx:     GynHx:   Menarche 18, irregular cycles every  days, lasting 7 days  Uterine procedures: polypectomy  Pap smears:2016- NILM, HPV neg  History of genital warts     PMHx:   Diagnosis Date     Anxiety      Depression      Essential hypertension      GERD (gastroesophageal reflux disease)      Obesity (BMI 35.0-39.9 without  comorbidity)      PCOS (polycystic ovarian syndrome)      SurgHx:   Procedure Laterality Date     OPERATIVE HYSTEROSCOPY      with polypectomy     Medications:   Medication     clonazePAM (KLONOPIN) 1 MG tablet (0.5 mg QAM and 1 mg QHS)     fluticasone (FLONASE) 50 MCG/ACT spray     venlafaxine (EFFEXOR-XR) 150 MG 24 hr capsule     METFORMIN HCL 1000 mg PO BID     LISINOPRIL 10 mg PO daily     HYDROCHLOROTHIAZIDE 12.5 mg PO daily     OMEPRAZOLE 40 mg PO daily     Allergies:    No Known Allergies    Soc:   Social History Main Topics     Smoking status: Never Smoker     Smokeless tobacco: Never Used     Alcohol use No     Drug use: No     FamHx:   Dad- DVT x2  Mom- miscarriage x2  No family history of recurrent miscarriage or clotting    In light of the patient's medical issues my impression and recommendations are summarized below    Chronic Hypertension    We discussed with MsDarrin Niles the implications of chronic hypertension in a future pregnancy.  The concern with chronic hypertension is that such patients are more likely to develop preeclampsia during the pregnancy, with a risk of at least 20%.  We discussed the maternal and fetal/ risks of preeclampsia including a risk of iatrogenic  birth with its associated morbidities and  death.  Low dose aspirin has been used to lower this risk. We also reviewed with risks of fetal growth restriction, stillbirth, placental abruption, and maternal stroke in the setting of uncontrolled hypertension.     With regards to her medications we discussed that lisinopril is contraindicated in pregnancy and should be stopped prior to conception. Hydrochlorothiazide can be used if necessary, but is not a preferred agent and should also be switched unless it is specifically indicated.  She should be transitioned to medications such as labetalol or nifedipine and have stable blood pressure on these medications for 2-3 months prior to conception.       Polycystic Ovarian Syndrome    Patients with PCOS are at risk for experiencing spontaneous  (20-40% increase) and gestational diabetes (7-fold increase).  Metformin may be of benefit when used prior to conception.  However, given the lack of Level I evidence, metformin is currently not recommended for use in pregnancy.    Depression/Anxiety    Approximately 10-15% of women of reproductive age are affected by depression.  Untreated psychiatric disease in pregnancy is serious; approximately 40-50% of untreated patients will have an exacerbation and 15% are at risk for suicidal ideation.  Moreover, untreated depression may increase the risk of stillbirth, IUGR,  delivery, low Apgar scores, sexually transmitted infections, and substance abuse.  A multicenter prospective study monitored 201 women with a history of major depression who were not clinically depressed at the time of conception but were taking antidepressant medication. Approximately two thirds of women who discontinued antidepressants relapsed during their pregnancies compared with 26% of women who relapsed among the women who maintained their therapy. Adjusted analysis reports that the risk of relapse for those that discontinue their medications is 5-6 times higher than those who maintain their dosages.  What is interesting about this study is that over 60% of patients who had previously discontinued their medications restarted them at some point in the pregnancy, indicating a great need to control depressive symptoms in pregnancy. Increasing or reintroduction of medications may attenuate the relapse risk.    In general the risk of uncontrolled psychiatric disease outweighs the risk of medications during pregnancy, and therefore medications should be continued if medically indicated.  Anxiolytic therapy is more controversial, as benzodiazepine administration is known to be addictive, and may cause  withdrawal syndrome.  Weaning  from benzodiazepines is recommended, if possible, prior to pregnancy.  The patient's maintenance medication can be up-titrated if needed.      Since Ms. Cage reports that she has been on sertraline in the past, which did not work for her, I would not recommend reinitiating this.  As far as she can remember she has been on multiple different medications and finally venlafaxine worked for her.   She should be stable on her medication regimen prior to pregnancy and given her timeline, she does not have a lot of time to try different medications, I would recommend staying on the venlafaxine.     Obesity    Obesity during pregnancy is associated with hypertension, preeclampsia, diabetes, venous thromboembolism, fetal macrosomia,  delivery, and complications of  delivery (excessive blood loss, infection, wound separation).  Obesity is associated with fetal anomalies, specifically neural tube defects. Multiple studies suggest that pre-pregnancy obesity (BMI ? 30) increases the risk for stillbirth, though the etiology is not known.        IVF    The use of IVF, with and without ICSI, has been associated with an increased risk for preeclampsia, congenital anomalies (specifically cardiac), hypertensive disorders, placental abruption, placenta previa and other placental abnormalities, and small for gestational age and  delivery.     ICSI has also been associated with imprinting disorders, such as Angélica-Wiedemann and Angelman s Syndromes, however there are no studies confirming these concerns as a causal relationship.     While the association between IVF and these adverse outcomes raise some concern, it is important to note that the literature is contradictory on this subject and the chances of having a healthy child conceived with ART are overall extremely high.    Advanced Maternal Age    The risk of fetal aneuploidy increases with age. We reviewed these risks and we discussed the options  available for risk assessment for aneuploidy during pregnancy.   We still recommend genetic counseling for routine aneuploidy screening (or testing) in cases of IVF with preimplantation genetics.     Patients of advanced age are also at increased risks of pregnancy complications, including miscarriage, preeclampsia, abnormalities with placentation, stillbirth, and  delivery.  These risks increase with increasing maternal age and comorbidities.     Preconceptional Recommendations    Optimization of the patient s comorbid conditions as detailed below.    Avoidance of alcohol.    Daily prenatal vitamin.     Daily folic acid supplement (400 mcg minimum).    Consultation with genetics was completed today.     Laboratory evaluation including:   o Rubella titer  o Varicella titer  o Hepatitis B surface antigen  o HIV testing  o Syphilis testing  o Complete blood count  o If the patient is Rh-negative, blood type determination of her partner may be considered    Switch lisinopril (and preferably hydrochlorothiazide) prior to conception; preferred agents are nifedipine and labetalol (or aldomet).    Baseline studies to evaluate for end-organ damage related to hypertension:   o Electrocardiogram (if abnormal an echocardiogram is indicated).   o LFTs, creatinine, 24 hour urine for protein and creatinine (significant proteinuria would warrant a nephrology consultation).    Diabetes screening, if not recently done.    Continue metformin.    Recommend attempting to wean benzodiazepines under the supervision of her psychiatrist.    Continue venlafaxine.    Health eating and regular exercise.    As much weight loss as possible.    Counseling by a nutritionist may be considered.    In light of her father's history of DVT (x2) it is not unreasonable to send an inherited thrombophilia work-up assuming that he has not been tested as this could change the management of the pregnancy.    Pregnancy Recommendations    Continue  necessary medications.    Low dose aspirin for preeclampsia prevention.    Baseline studies:   o Electrocardiogram (if not done within a year).   o LFTs, creatinine, 24 hour urine for protein and creatinine    Close monitoring for evidence of superimposed preeclampsia.    Early genetics consultation for aneuploidy screening.    Targeted ultrasound at 18-20 weeks.    Fetal echocardiogram at 20-22 weeks.    Ultrasounds for growth monthly after 24 weeks.     fetal surveillance with weekly BPP (or NST and MVP) starting at 32 weeks.    Delivery by 39 weeks.    Discontinue metformin.    Early 1-hour glucose challenge test.  If the results are normal, repeat testing is recommended at 26-28 weeks.    Continued psychiatry care through pregnancy and the postpartum period, serial mood assessment.    Weight gain should be limited to <11-20 lbs, if any.    Early ambulation after delivery, with other thromboprophylaxis as necessary.      In summary, we discussed that Ms. Cage has no absolute contraindication to pregnancy, but has multiple medical issues that increase her risk of both maternal and fetal/ complications.  We discussed that, in general, pregnancy never benefits the health of the mother.      Jahaira Nash MD   Resident Physician, PGY2  Obstetrics, Gynecology, and Women's Health  I served as scribe for Dr. Lipscomb for this consultation     Thank you for the opportunity to participate in the care of this patient.  If you have questions regarding today s evaluation or if we can be of further service, please contact the [facility].    MFM Attending Attestation  The documentation recorded by the scribe accurately reflects the services I personally performed and the decisions made by me. I spent a total of 40 minutes face-to-face with Yeimi Cage during today's office visit. Over 50% of this time was spent counseling the patient and/or coordinating care regarding her medical  co-morbidities and plan for pregnancy. See note for details; I have made the necessary edits/additions.      Trudy Lipscomb MD  , OB/GYN  Maternal-Fetal Medicine  [email or web address]  [phone] (Academic office)  [phone] (Pager)     Cc: Kee Frank NP [facility]

## 2017-11-28 ENCOUNTER — TELEPHONE (OUTPATIENT)
Dept: PHARMACY | Facility: CLINIC | Age: 42
End: 2017-11-28

## 2017-11-28 NOTE — TELEPHONE ENCOUNTER
Patient called to discussed clonazepam tapering in preparing for pregnancy.  She reports she was having hot flashes and nausea;  Her pharmacist-sister felt she was having withdrawal symptoms.      Patient reports she had been on clonazepam 2mg total daily (she had previously told us 1.5mg total)  She dropped to clonazepam 1.5 mg for 1 week.  Then went down to 1 mg daily at night when her symptoms started.  I had strongly recommended she discuss her taper with her psych NP Alysa Benz, but patient has not met with her yet.      She is planning IVF with egg retrieval around Feb 1.    Currently on 0.5 mg of clonazepam and 1 mg at night and is doing fine.  Patient would like suggestions on how to taper from here.  Educated patient that withdrawal may have been from either too much of a dose decrease too quickly, or it could be related to going quickly from BID to a once daily dosing.    Patient was educated on this suggested taper, but was told it's important that she see Alysa Benz again for final tapering plan, a plan for monitoring, and a prescription for lower dose of clonazepam (patient has 1 mg tablets at home).    Week of 12/11:  Decrease to 0.5 mg twice a day  Week of 12/25:  Decrease to 0.25 mg in the morning and 0.5 mg at night  Week of 1/8:  Decrease to 0.25 mg twice a day  Week of 1/22:  Decrease to 0.25 mg at night  2/1:  Off      Trudy Teixeira, Pharm.D., BCPS

## 2017-12-07 ENCOUNTER — TELEPHONE (OUTPATIENT)
Dept: PHARMACY | Facility: CLINIC | Age: 42
End: 2017-12-07

## 2017-12-07 NOTE — TELEPHONE ENCOUNTER
Patient called with questions on blood pressure medications.  She stopped lisinopril.  HCTZ was increase to 25mg by primary;  Diastolic BP stayed in the 90's.    Saw PCP yesterday, who started methyldopa 250 mg BID.  This morning her /80.  Patient is concerned BP may go too low.    Patient educated that current BP is great, and continuing current medications is appropriate.  She has planned f/u with her PCP in 2 weeks to continue to follow blood pressure.      Patient's taper off of clonazepam is going well;  She is currently on 0.25 mg every morning and 1 mg at night.    Patient satisfied;  All questions answered.    Trudy Teixeira, Pharm.D., BCPS

## 2018-04-27 ENCOUNTER — RECORDS - HEALTHEAST (OUTPATIENT)
Dept: LAB | Facility: CLINIC | Age: 43
End: 2018-04-27

## 2018-04-28 LAB — HIV 1+2 AB+HIV1 P24 AG SERPL QL IA: NEGATIVE

## 2018-04-29 LAB — T PALLIDUM AB SER QL: NEGATIVE

## 2018-04-30 LAB
C TRACH DNA SPEC QL PROBE+SIG AMP: NEGATIVE
CMV IGG SERPL IA-ACNC: <0.2 AI (ref 0–0.8)
HBV CORE AB SERPL QL IA: NEGATIVE
HBV SURFACE AG SERPL QL IA: NEGATIVE
HCV AB SERPL QL IA: NEGATIVE
N GONORRHOEA DNA SPEC QL NAA+PROBE: NEGATIVE